# Patient Record
Sex: FEMALE
[De-identification: names, ages, dates, MRNs, and addresses within clinical notes are randomized per-mention and may not be internally consistent; named-entity substitution may affect disease eponyms.]

---

## 2022-09-22 ENCOUNTER — APPOINTMENT (OUTPATIENT)
Dept: ORTHOPEDIC SURGERY | Facility: CLINIC | Age: 52
End: 2022-09-22

## 2022-10-20 ENCOUNTER — APPOINTMENT (OUTPATIENT)
Dept: ORTHOPEDIC SURGERY | Facility: CLINIC | Age: 52
End: 2022-10-20

## 2022-10-20 PROCEDURE — 99204 OFFICE O/P NEW MOD 45 MIN: CPT

## 2022-10-20 RX ORDER — CYCLOBENZAPRINE HYDROCHLORIDE 5 MG/1
5 TABLET, FILM COATED ORAL
Qty: 45 | Refills: 1 | Status: ACTIVE | COMMUNITY
Start: 2022-10-20 | End: 1900-01-01

## 2022-10-20 NOTE — ASSESSMENT
[FreeTextEntry1] : Intermittent RUE radic, no moreso spasm\par \par NSAIDs- Patient warned of risk of medication to GI tract, increased blood pressure, cardiac risk, and risk of fluid retention.  Advised to clear medication with internist or PCP if any concurrent health problem with heart, blood pressure, or GI system exists.\par \par Muscle Relaxants- To help decrease muscle spasm and assist with pain relief. Advised of sedating effects and instructed not to drive, operate heavy machinery, or take with other sedating medications.

## 2022-10-20 NOTE — IMAGING
[de-identified] : CSPINE\par Inspection: No rash or ecchymosis\par Palpation: No spasm in traps, rhomboids, paracervicals\par ROM: Full with no pain\par Strength: 5/5 bilateral deltoid, biceps, triceps, wrist flexors, wrist extensors, , abductors\par Sensation: Sensation present to light touch bilateral C5-T1 distributions\par Reflexes: Positive Calderon's bilaterally \par \par Bilateral Shoulders-\par Palpation: No tenderness to palpation\par ROM: Full with no pain\par Strength: Decent strength with rotator cuff testing\par Provocative maneuvers: Negative impingement testing  [Straightening consistent with spasm] : Straightening consistent with spasm [Disc space narrowing] : Disc space narrowing

## 2022-10-20 NOTE — HISTORY OF PRESENT ILLNESS
[Neck] : neck [Dull/Aching] : dull/aching [Intermittent] : intermittent [Sleep] : sleep [Lying in bed] : lying in bed [de-identified] : Pt seen by non-spine partners in the past \par 9/13/21 Standup MRI C MRI  - report noted in chart. \par C2-C3: Bulge with central herniation and no stenosis. Facet hypertrophy left\par greater than right with no significant foraminal stenosis.\par \par C3-C4: Bulge with no herniation or central stenosis. No foraminal stenosis.\par \par C4-C5: Bulge with no herniation or central stenosis. No foraminal stenosis.\par \par C5-C6: Bulge with no herniation or central stenosis. Luschka hypertrophy with\par no foraminal stenosis.\par \par C6-C7: Bulge with asymmetric to right herniation impressing on the thecal sac\par with contact of the cord but no central stenosis. Luschka hypertrophy with no\par foraminal stenosis.\par \par C7-T1: No herniation, foraminal stenosis, or central stenosis.\par Ind. review- \par C5/6 bulge with mild NF narrowing;\par C6/7 R paracentral HNP with NF narrowing\par --------------------\par NOTE FROM [facility]:  Ms. Dipti Abreu, a 51-year-old female, presents today for a few weeks of neck pain with NT and pain radiating down the rigth arm when waking up. also pain in the scapular area. notes similar pain 4 moths ago with known hx of OA.\par 9/24/21- notes improved with steroid then pain returned and started steroid for something else, feeling better. \par mri cs - standup - indepepdnetly interpreted - c6-7 r side/central hnp\par 10/22/21- notes limited improvement with PT. notes she still has trouble sleeping and pain around shoulder blade. notes\par left side arm radicular pain is improved. \par \par 10/20/2022: DIPTI is a 52 year old female here today for neck pain. pt states pain is the same since seeing [facility] 10/2021. Continued pain b/l, though less pain in the R scapula and to the RUE. No bb dysfunction. Intermittent N/T to the RUE>LUE\par  [] : no [FreeTextEntry5] : \par  [FreeTextEntry9] : stretching  [de-identified] : 10/2021 [de-identified] : Cesarib

## 2022-12-01 ENCOUNTER — APPOINTMENT (OUTPATIENT)
Dept: ORTHOPEDIC SURGERY | Facility: CLINIC | Age: 52
End: 2022-12-01

## 2023-05-09 ENCOUNTER — APPOINTMENT (OUTPATIENT)
Dept: ORTHOPEDIC SURGERY | Facility: CLINIC | Age: 53
End: 2023-05-09
Payer: COMMERCIAL

## 2023-05-09 VITALS — HEIGHT: 65 IN | BODY MASS INDEX: 19.66 KG/M2 | WEIGHT: 118 LBS

## 2023-05-09 DIAGNOSIS — Z78.9 OTHER SPECIFIED HEALTH STATUS: ICD-10-CM

## 2023-05-09 DIAGNOSIS — M62.838 OTHER MUSCLE SPASM: ICD-10-CM

## 2023-05-09 DIAGNOSIS — Z00.00 ENCOUNTER FOR GENERAL ADULT MEDICAL EXAMINATION W/OUT ABNORMAL FINDINGS: ICD-10-CM

## 2023-05-09 DIAGNOSIS — G25.89 OTHER SPECIFIED EXTRAPYRAMIDAL AND MOVEMENT DISORDERS: ICD-10-CM

## 2023-05-09 PROCEDURE — 73010 X-RAY EXAM OF SHOULDER BLADE: CPT | Mod: RT

## 2023-05-09 PROCEDURE — 99214 OFFICE O/P EST MOD 30 MIN: CPT

## 2023-05-09 PROCEDURE — 73030 X-RAY EXAM OF SHOULDER: CPT | Mod: RT

## 2023-05-09 RX ORDER — METHYLPREDNISOLONE 4 MG/1
4 TABLET ORAL
Qty: 1 | Refills: 0 | Status: ACTIVE | COMMUNITY
Start: 2023-05-09 | End: 1900-01-01

## 2023-05-09 NOTE — DISCUSSION/SUMMARY
[de-identified] : 53f with right UE cervical radiculopathy and scapular dyskinesia\par 1) MDP rx, discussed no nsaids while taking\par 2) can c/w with cyclobenzaprine prn\par 3) start physical therapy\par 4) recommend glucosamine / chondroitin\par 5) heat, rest and activity modification\par 6) rtc 4-6 weeks\par \par Entered by Oma Izaguirre acting as scribe.\par Dr. Yadav-\par The documentation recorded by the scribe accurately reflects the service I personally performed and the decisions made by me.

## 2023-05-09 NOTE — HISTORY OF PRESENT ILLNESS
[9] : 9 [0] : 0 [Tightness] : tightness [Leisure] : leisure [Work] : work [Sleep] : sleep [Heat] : heat [Extending back] : extending back [de-identified] : 05/09/2023 Ms. DIPTI CHAU, a 53 year old female, presents today for right shoulder. Hx of cervical radiculopathy and HNP on MRI from 9/2021. Reports that recently while running she started to experience pain over the right shoulder, upper arm and scapular region. Also notes some numbness over the upper arm. was seen in a UC on 05.07.23 and was given cyclobenzaprine and naproxen. \par  [] : no [FreeTextEntry1] : Rt Shoulder Blade/ Rt Arm  [FreeTextEntry3] : 05/07/23 [FreeTextEntry5] : Pt complaining of ongoing Rt Shoulder blade pain and Rt Arm pain since Sunday. ROM limied. Pt feels numbness going down the arm no tingling.   [FreeTextEntry9] : heating pad  [de-identified] : Urgent Care  [de-identified] : EKG, CT Lungs

## 2023-05-09 NOTE — PHYSICAL EXAM
[Right] : right shoulder [NL (0-180)] : full active abduction 0-180 degrees [NL (0-70)] : full internal rotation 0-70 degrees [NL (0-90)] : full external rotation 0-90 degrees [] : motor and sensory intact distally [de-identified] : -kizzy

## 2023-05-15 ENCOUNTER — APPOINTMENT (OUTPATIENT)
Dept: ORTHOPEDIC SURGERY | Facility: CLINIC | Age: 53
End: 2023-05-15
Payer: COMMERCIAL

## 2023-05-15 PROCEDURE — 72040 X-RAY EXAM NECK SPINE 2-3 VW: CPT

## 2023-05-15 PROCEDURE — 99213 OFFICE O/P EST LOW 20 MIN: CPT

## 2023-05-16 NOTE — HISTORY OF PRESENT ILLNESS
[7] : 7 [Dull/Aching] : dull/aching [Throbbing] : throbbing [Constant] : constant [Bending forward] : bending forward [Extending back] : extending back [] : yes [de-identified] : 53-year-old right-hand-dominant female presents the office today for evaluation of radiating right shoulder and neck pain.  Patient reports roughly 2 weeks of pain radiating from her neck into her right hand.  She has a history of similar symptoms roughly 2 years ago which were treated with a course of oral steroids.  She was recently seen at urgent care where she was given lidocaine patches, anti-inflammatories and another Medrol Dosepak.  Unfortunately the patient reports persistent shoulder pain and paresthesias after completing the Medrol Dosepak.  She is quite bothered by this and requests some intervention today.\par \par Of note, the patient had an MRI of the cervical spine in 2021 with C5/C6 disc bulge and mild NF narrowing, C6/7 right paracentral HNP and NF narrowing. [de-identified] : 05/09/2023 [de-identified] :

## 2023-05-16 NOTE — IMAGING
[de-identified] : Full neck range of motion\par - Spurling's\par - Calderon's\par \par Shoulder (R / L)\par Normal muscle tone/ bulk\par NTTP throughout\par  / 170\par Abd 80 / 80\par ER at side 60 / 60\par IR T10 / T10\par SILT throughout\par - Elmira's\par - Belly Press\par - Hawkin's \par - Mayorga's \par 4+/5 elbow flexion\par 4+/5 elbow extension\par 5/5 shoulder abduction\par 5/5 wrist extension\par 5/5 finger abduction

## 2023-05-16 NOTE — HISTORY OF PRESENT ILLNESS
[7] : 7 [Dull/Aching] : dull/aching [Throbbing] : throbbing [Constant] : constant [Bending forward] : bending forward [Extending back] : extending back [] : yes [de-identified] : 53-year-old right-hand-dominant female presents the office today for evaluation of radiating right shoulder and neck pain.  Patient reports roughly 2 weeks of pain radiating from her neck into her right hand.  She has a history of similar symptoms roughly 2 years ago which were treated with a course of oral steroids.  She was recently seen at urgent care where she was given lidocaine patches, anti-inflammatories and another Medrol Dosepak.  Unfortunately the patient reports persistent shoulder pain and paresthesias after completing the Medrol Dosepak.  She is quite bothered by this and requests some intervention today.\par \par Of note, the patient had an MRI of the cervical spine in 2021 with C5/C6 disc bulge and mild NF narrowing, C6/7 right paracentral HNP and NF narrowing. [de-identified] : 05/09/2023 [de-identified] :

## 2023-05-16 NOTE — IMAGING
[de-identified] : Full neck range of motion\par - Spurling's\par - Calderon's\par \par Shoulder (R / L)\par Normal muscle tone/ bulk\par NTTP throughout\par  / 170\par Abd 80 / 80\par ER at side 60 / 60\par IR T10 / T10\par SILT throughout\par - Elmira's\par - Belly Press\par - Hawkin's \par - Mayorga's \par 4+/5 elbow flexion\par 4+/5 elbow extension\par 5/5 shoulder abduction\par 5/5 wrist extension\par 5/5 finger abduction

## 2023-05-16 NOTE — DISCUSSION/SUMMARY
[de-identified] : - discussed etiology/ pathophysiology of the patient's symptoms\par - reviewed treatment options\par - discussed the role for injections, physical therapy and anti-inflammatory medications\par - reviewed r/b/a to these\par - activity modifications\par - PT Rx\par - NSAIDs prn pain\par - f/u with Dr. Hubbard for possible CSI

## 2023-05-16 NOTE — DATA REVIEWED
[FreeTextEntry1] : 2 views of cervical spine: Loss of cervical lordosis, spontaneous posterior fusion at C2-C3, no acute fractures

## 2023-05-16 NOTE — DISCUSSION/SUMMARY
[de-identified] : - discussed etiology/ pathophysiology of the patient's symptoms\par - reviewed treatment options\par - discussed the role for injections, physical therapy and anti-inflammatory medications\par - reviewed r/b/a to these\par - activity modifications\par - PT Rx\par - NSAIDs prn pain\par - f/u with Dr. Hubbard for possible CSI

## 2023-05-17 ENCOUNTER — APPOINTMENT (OUTPATIENT)
Dept: PAIN MANAGEMENT | Facility: CLINIC | Age: 53
End: 2023-05-17
Payer: COMMERCIAL

## 2023-05-17 VITALS — HEIGHT: 65 IN | BODY MASS INDEX: 19.66 KG/M2 | WEIGHT: 118 LBS

## 2023-05-17 DIAGNOSIS — M79.18 MYALGIA, OTHER SITE: ICD-10-CM

## 2023-05-17 PROCEDURE — 20552 NJX 1/MLT TRIGGER POINT 1/2: CPT

## 2023-05-17 PROCEDURE — J3490M: CUSTOM

## 2023-05-17 PROCEDURE — 99204 OFFICE O/P NEW MOD 45 MIN: CPT | Mod: 25

## 2023-05-17 RX ORDER — GABAPENTIN 300 MG/1
300 CAPSULE ORAL 3 TIMES DAILY
Qty: 90 | Refills: 0 | Status: ACTIVE | COMMUNITY
Start: 2023-05-17 | End: 1900-01-01

## 2023-05-17 NOTE — DISCUSSION/SUMMARY
[de-identified] : \par After discussing various treatment options with the patient including but not limited to oral medications, physical therapy, exercise, modalities as well as interventional spinal injections, we have decided with the following plan: \par \par Cervical Radiculopathy: \par - start gabapentin 300mg TID, c/w flexeril 5-10mg tid prn\par - MRI C-spine 9/11/21 report reviewed (stand up MRI). We will obtain an updated MRI as her symptoms have changed since 2021. \par - consider C7/T1 NAZARIO after MRI.  \par - c/w HEP/PT \par \par Myofascial Pain: \par - c/w pharmacological agents as above\par - TPI to cervical paraspinal, R trapezius performed. Procedure explained using an anatomical model.  Risks and benefits explained to patient who verbalized understanding, including increased risk of infection, bleeding, nerve injury.\par - c/w HEP/PT\par \par - follow up 1-2 weeks for MRI review. \par \par Octavio Hubbard MD\par \par

## 2023-05-17 NOTE — PROCEDURE
[FreeTextEntry3] : \par Trigger Point Procedure Note\par Proceduretrigger point injections of R cervical paraspinal, R trapezius\par Diagnosis: myofascial pain\par \par After obtaining informed consent and performing a time out, the area overlying the appropriate muscles was prepped and draped in sterile fashion. Bupivacaine 0.25% 8 mL + kenalog 10mg was injected at 2 trigger point areas through a 25G needle after negative aspiration for heme. Bandaids applied to injection sites. No complications. \par \par Remainder to bupivacaine 30ml vial wasted.\par

## 2023-05-17 NOTE — PHYSICAL EXAM
[de-identified] : Gen: NAD\par Psych: mood appropriate for given condition\par Skin: intact\par Sensation: decreased to light touch over R arm, intact to lt touch bilaterally in c4-t1 \par Reflexes: 2+ b/l BR, Bicep, BR and patella Calderon negative\par ROM: Cervical ROM full, shoulder, elbow and wrist ROM full, \par Special tests: Cisneros's negative bilaterally, Hawkin's negative bilaterally, neg Empty can, neg Neer's. Spurling's pos on the R\par Palpation: +taught bands of muscle causing referred pain upon palpation of cervical paraspinal and right trapezius, tender levator scapula and left trapezius non tender bicipital tendon\par Motor:\par 				Right	Left	\par C4	Shoulder Abduction	 5	 5	\par C5	Elbow Flexion  		 5	 5	\par C6	Wrist Extension		 5	 5	\par C7	Elbow Extension 	 4	 4	\par C8/T1	Hand Intrinsics 		 5	 5	\par C8	First Dorsal Interosseus	 5	 5	\par C8	Abductor Pollicus Brevis	 5	 5	\par C5/6	Shoulder ER		 5	 5	\par \par

## 2023-06-06 ENCOUNTER — APPOINTMENT (OUTPATIENT)
Dept: PAIN MANAGEMENT | Facility: CLINIC | Age: 53
End: 2023-06-06
Payer: COMMERCIAL

## 2023-06-06 VITALS — WEIGHT: 118 LBS | HEIGHT: 65 IN | BODY MASS INDEX: 19.66 KG/M2

## 2023-06-06 PROCEDURE — 99214 OFFICE O/P EST MOD 30 MIN: CPT

## 2023-06-06 NOTE — HISTORY OF PRESENT ILLNESS
[Neck] : neck [Right Arm] : right arm [9] : 9 [7] : 7 [Radiating] : radiating [Sharp] : sharp [Shooting] : shooting [Stabbing] : stabbing [Tingling] : tingling [Constant] : constant [Household chores] : household chores [Leisure] : leisure [Sleep] : sleep [Social interactions] : social interactions [Heat] : heat [Sitting] : sitting [Standing] : standing [Walking] : walking [Bending forward] : bending forward [Exercising] : exercising [Stairs] : stairs [Lying in bed] : lying in bed [FreeTextEntry1] : Interval HPI 06/06/2023\par Pt returns after updated MRI C-spine for review. Her pain is similar in quality, characteristics, severity as iniital visit.  Patient denies loss of bowel/bladder function, new motor deficits, fevers, or chills. There is also associated numbness/tingling.\par \par TPI from 5/17/23 is giving her 50% ongoing relief of the myofascial pain. She continues PT and home stretches.  \par \par Initial HPI 5/17/23\par Ms. CHAU is a 53 year year old F who presents for initial evaluation for neck and arm pain. Pain started 2 years ago.  Has tried a home exercise program by a physician.  It is not associated with any inciting injury.  Pain radiates to down the R/L arm. Pain is described as shooting and electric shock when radiating.  Pain is worsened with sitting, coughing and bearing down.  Patient has failed conservative treatment with acetaminophen, NSAIDs, muscle relaxants, and physical therapy/HEP. Pain is causing significant functional limitation resulting in diminished quality of life and impaired age appropriate ADL's. Patient denies loss of bowel/bladder function, new motor deficits, fevers, or chills. There is also associated numbness/tingling.\par \par She also reports muscle spasm like pain, rated 7/10, aching, throbbing, worse with movement, alleivated with rest. She has tried tylenol, nsaids, flexeril, hot packs, lidocaine patch without relief. \par \par Images Reviewed: \par MRI C-spine 9/11/21 (stand up MRI, scanned into chart)\par  [] : no [de-identified] : mri

## 2023-06-06 NOTE — PHYSICAL EXAM
[de-identified] : Gen: NAD\par Psych: mood appropriate for given condition\par Skin: intact\par Sensation: decreased to light touch over R arm, intact to lt touch bilaterally in c4-t1 \par Reflexes: 2+ b/l BR, Bicep, BR and patella Calderon negative\par ROM: Cervical ROM full, shoulder, elbow and wrist ROM full, \par Special tests: Cisneros's negative bilaterally, Hawkin's negative bilaterally, neg Empty can, neg Neer's. Spurling's pos on the R\par Palpation: +taught bands of muscle causing referred pain upon palpation of cervical paraspinal and right trapezius, tender levator scapula and left trapezius non tender bicipital tendon\par Motor:\par 				Right	Left	\par C4	Shoulder Abduction	 5	 5	\par C5	Elbow Flexion  		 5	 5	\par C6	Wrist Extension		 5	 5	\par C7	Elbow Extension 	 4	 4	\par C8/T1	Hand Intrinsics 		 5	 5	\par C8	First Dorsal Interosseus	 5	 5	\par C8	Abductor Pollicus Brevis	 5	 5	\par C5/6	Shoulder ER		 5	 5	\par \par

## 2023-06-06 NOTE — DISCUSSION/SUMMARY
[de-identified] : \par After discussing various treatment options with the patient including but not limited to oral medications, physical therapy, exercise, modalities as well as interventional spinal injections, we have decided with the following plan: \par \par Cervical Radiculopathy: \par - c/w gabapentin 300mg TID, c/w flexeril 5-10mg tid prn\par - MRI C-spine 5/31/23 reviewed \par - schedule for C7/T1 NAZARIO. Procedure explained using an anatomical model.  Risks and benefits explained to patient who verbalized understanding, including increased risk of infection, bleeding, nerve injury.  \par - new referral for PT given  \par \par Myofascial Pain: \par - c/w pharmacological agents as above\par - can repeat TPI to cervical paraspinal, R trapezius performed if pain worsens \par - c/w HEP/PT\par \par - follow up 2-3 weeks post-procedure. \par \par Octavio Hubbard MD\par \par ILESI/CAUDAL\par \par Indications for an interlaminar epidural for this specific patient include the following \par \par - Pt has been in pain for at least 6 weeks and has failed conservative care (e.g. Exercise, physical methods, NSAID/ and or muscle relaxants) and has been compliant with these conservative measures \par - Pt has no major risk factors for spinal cancer or contraindicated condition \par - radicular pain is interfering with functional activity\par - pain is associated with symptoms of nerve root irritation \par - any numbness documented is accompanied with paresthesia \par - no evidence of systemic or local infection, bleeding tendency or unstable medical condition \par - Interlaminar epidural injections are provided as part of a comprehensive pain management program, which includes physical therapy, patient education, psychosocial support, and oral medications, where appropriate.\par - Pt has significant functional limitation resulting in diminished quality of life and impaired age appropriate ADL's. \par - diagnostic evaluation has ruled out other causes of pain\par - pt participating in an active rehabilitation program or home exercise program which has been discussed with the patient including heat ice and rest\par - no more than 3 epidurals will be done in a 6 month period at the same level with at least 14 days between injections\par - All repeat injections have at least 50% pain relief and increase functional gain and physical activity, and reduction in reliance on the use of medication and or physical therapy\par - MAC is only offered in cases of severe needle phobia \par -  Injection done at C7/T1 level(s) which is consistent with patient's dermatomal pain complaint\par

## 2023-06-12 ENCOUNTER — APPOINTMENT (OUTPATIENT)
Dept: PAIN MANAGEMENT | Facility: CLINIC | Age: 53
End: 2023-06-12
Payer: COMMERCIAL

## 2023-06-12 PROCEDURE — 62321 NJX INTERLAMINAR CRV/THRC: CPT

## 2023-06-12 NOTE — PROCEDURE
[FreeTextEntry3] : Date of Service: 06/12/2023 \par \par Account: [id number] \par \par Patient: DIPTI CHAU \par \par YOB: 1970 \par \par Age: 53 year \par \par Surgeon:                                                      Octavio Hubbard M.D.\par \par Pre-Operative Diagnosis:                         Cervical Radiculopathy (M54.12) \par \par Post Operative Diagnosis:                       Cervical Radiculopathy (M54.12)\par \par Procedure:                                                  Interlaminar cervical epidural steroid injection (C7-T1) under fluoroscopic guidance\par \par Anesthesia:                                                 MAC\par \par \par This procedure was carried out using fluoroscopic guidance.  The risks and benefits of the procedure were discussed extensively with the patient.  The consent of the patient was obtained and the following procedure was performed.\par \par The patient was placed in the prone position using a thoracic and chin support.  The cervical area was prepped and draped in a sterile fashion.  The fluoroscope visualized the C7-T1 interspace using slight cephalad-caudad angulation and this area was marked.  Using sterile technique the superficial skin was anesthetized with 1% Lidocaine without epinephrine.  A 18 gauge Tuohy needle was advanced under fluoroscopy using [id number]-dopta technique until ligament was engaged.  The stilette was then removed and a loss of resistance syringe with sterile saline was attached. The needle was then advanced under fluoroscopic guidance until there was loss of resistance.  Lateral view confirmed final needle tip placement in the epidural space.  After negative aspiration for heme and CSF, 1 cc of Omnipaque confirmed good cervical epiduragram.  \par \par Cervical epidurogram showed no evidence of intrathecal or intravascular flow, and good bilateral epidural flow from C3 to T2 levels.  An injectate of 6 cc of preservative free normal saline plus 12 mg of betamethasone was then injected into the epidural space. The needle was subsequently removed and pressure was applied.\par \par Anesthesia personnel were present throughout the procedure.  The patient tolerated the procedure well and was instructed to contact me immediately if there were any problems.\par \par Octavio Hubbard M.D.\par

## 2023-06-22 ENCOUNTER — APPOINTMENT (OUTPATIENT)
Dept: ORTHOPEDIC SURGERY | Facility: CLINIC | Age: 53
End: 2023-06-22

## 2023-07-03 NOTE — HISTORY OF PRESENT ILLNESS
[FreeTextEntry1] : 07/05/2023\par PP FU C7-T1 NAZARIO ON 06/12/23 \par \par Interval HPI 06/06/2023\par Pt returns after updated MRI C-spine for review. Her pain is similar in quality, characteristics, severity as iniital visit.  Patient denies loss of bowel/bladder function, new motor deficits, fevers, or chills. There is also associated numbness/tingling.\par \par TPI from 5/17/23 is giving her 50% ongoing relief of the myofascial pain. She continues PT and home stretches.  \par \par Initial HPI 5/17/23\par Ms. CHAU is a 53 year year old F who presents for initial evaluation for neck and arm pain. Pain started 2 years ago.  Has tried a home exercise program by a physician.  It is not associated with any inciting injury.  Pain radiates to down the R/L arm. Pain is described as shooting and electric shock when radiating.  Pain is worsened with sitting, coughing and bearing down.  Patient has failed conservative treatment with acetaminophen, NSAIDs, muscle relaxants, and physical therapy/HEP. Pain is causing significant functional limitation resulting in diminished quality of life and impaired age appropriate ADL's. Patient denies loss of bowel/bladder function, new motor deficits, fevers, or chills. There is also associated numbness/tingling.\par \par She also reports muscle spasm like pain, rated 7/10, aching, throbbing, worse with movement, alleivated with rest. She has tried tylenol, nsaids, flexeril, hot packs, lidocaine patch without relief. \par \par Images Reviewed: \par MRI C-spine 9/11/21 (stand up MRI, scanned into chart)\par  [Neck] : neck [Right Arm] : right arm [9] : 9 [7] : 7 [Radiating] : radiating [Sharp] : sharp [Shooting] : shooting [Stabbing] : stabbing [Tingling] : tingling [Constant] : constant [Household chores] : household chores [Leisure] : leisure [Sleep] : sleep [Social interactions] : social interactions [Heat] : heat [Sitting] : sitting [Standing] : standing [Walking] : walking [Bending forward] : bending forward [Exercising] : exercising [Stairs] : stairs [Lying in bed] : lying in bed [] : yes [de-identified] : mri

## 2023-07-03 NOTE — PHYSICAL EXAM
[de-identified] : Gen: NAD\par Psych: mood appropriate for given condition\par Skin: intact\par Sensation: decreased to light touch over R arm, intact to lt touch bilaterally in c4-t1 \par Reflexes: 2+ b/l BR, Bicep, BR and patella Calderon negative\par ROM: Cervical ROM full, shoulder, elbow and wrist ROM full, \par Special tests: Cisneros's negative bilaterally, Hawkin's negative bilaterally, neg Empty can, neg Neer's. Spurling's pos on the R\par Palpation: +taught bands of muscle causing referred pain upon palpation of cervical paraspinal and right trapezius, tender levator scapula and left trapezius non tender bicipital tendon\par Motor:\par 				Right	Left	\par C4	Shoulder Abduction	 5	 5	\par C5	Elbow Flexion  		 5	 5	\par C6	Wrist Extension		 5	 5	\par C7	Elbow Extension 	 4	 4	\par C8/T1	Hand Intrinsics 		 5	 5	\par C8	First Dorsal Interosseus	 5	 5	\par C8	Abductor Pollicus Brevis	 5	 5	\par C5/6	Shoulder ER		 5	 5	\par \par

## 2023-07-03 NOTE — DISCUSSION/SUMMARY
[de-identified] : \par After discussing various treatment options with the patient including but not limited to oral medications, physical therapy, exercise, modalities as well as interventional spinal injections, we have decided with the following plan: \par \par Cervical Radiculopathy: \par - c/w gabapentin 300mg TID, c/w flexeril 5-10mg tid prn\par - MRI C-spine 5/31/23 reviewed \par - schedule for C7/T1 NAZARIO. Procedure explained using an anatomical model.  Risks and benefits explained to patient who verbalized understanding, including increased risk of infection, bleeding, nerve injury.  \par - new referral for PT given  \par \par Myofascial Pain: \par - c/w pharmacological agents as above\par - can repeat TPI to cervical paraspinal, R trapezius performed if pain worsens \par - c/w HEP/PT\par \par - follow up 2-3 weeks post-procedure. \par \par Octavio Hubbard MD\par \par ILESI/CAUDAL\par \par Indications for an interlaminar epidural for this specific patient include the following \par \par - Pt has been in pain for at least 6 weeks and has failed conservative care (e.g. Exercise, physical methods, NSAID/ and or muscle relaxants) and has been compliant with these conservative measures \par - Pt has no major risk factors for spinal cancer or contraindicated condition \par - radicular pain is interfering with functional activity\par - pain is associated with symptoms of nerve root irritation \par - any numbness documented is accompanied with paresthesia \par - no evidence of systemic or local infection, bleeding tendency or unstable medical condition \par - Interlaminar epidural injections are provided as part of a comprehensive pain management program, which includes physical therapy, patient education, psychosocial support, and oral medications, where appropriate.\par - Pt has significant functional limitation resulting in diminished quality of life and impaired age appropriate ADL's. \par - diagnostic evaluation has ruled out other causes of pain\par - pt participating in an active rehabilitation program or home exercise program which has been discussed with the patient including heat ice and rest\par - no more than 3 epidurals will be done in a 6 month period at the same level with at least 14 days between injections\par - All repeat injections have at least 50% pain relief and increase functional gain and physical activity, and reduction in reliance on the use of medication and or physical therapy\par - MAC is only offered in cases of severe needle phobia \par -  Injection done at C7/T1 level(s) which is consistent with patient's dermatomal pain complaint\par

## 2023-07-05 ENCOUNTER — APPOINTMENT (OUTPATIENT)
Dept: PAIN MANAGEMENT | Facility: CLINIC | Age: 53
End: 2023-07-05
Payer: COMMERCIAL

## 2023-07-07 ENCOUNTER — APPOINTMENT (OUTPATIENT)
Dept: PAIN MANAGEMENT | Facility: CLINIC | Age: 53
End: 2023-07-07
Payer: COMMERCIAL

## 2023-07-07 VITALS — WEIGHT: 118 LBS | BODY MASS INDEX: 19.66 KG/M2 | HEIGHT: 65 IN

## 2023-07-07 DIAGNOSIS — M54.12 RADICULOPATHY, CERVICAL REGION: ICD-10-CM

## 2023-07-07 PROCEDURE — 99213 OFFICE O/P EST LOW 20 MIN: CPT

## 2023-07-07 NOTE — HISTORY OF PRESENT ILLNESS
[Neck] : neck [Right Arm] : right arm [9] : 9 [7] : 7 [Radiating] : radiating [Sharp] : sharp [Shooting] : shooting [Stabbing] : stabbing [Tingling] : tingling [Constant] : constant [Household chores] : household chores [Leisure] : leisure [Sleep] : sleep [Social interactions] : social interactions [Heat] : heat [Sitting] : sitting [Standing] : standing [Walking] : walking [Bending forward] : bending forward [Exercising] : exercising [Stairs] : stairs [Lying in bed] : lying in bed [FreeTextEntry1] : Interval HPI 07/07/2023\par Pt returns after C7-T1 NAZARIO ON 06/12/23. She reports >90% ongoing relief, with improvement of ADLs and quality of life. \par \par Interval HPI 06/06/2023\par Pt returns after updated MRI C-spine for review. Her pain is similar in quality, characteristics, severity as iniital visit.  Patient denies loss of bowel/bladder function, new motor deficits, fevers, or chills. There is also associated numbness/tingling.\par \par TPI from 5/17/23 is giving her 50% ongoing relief of the myofascial pain. She continues PT and home stretches.  \par \par Initial HPI 5/17/23\par Ms. CHAU is a 53 year year old F who presents for initial evaluation for neck and arm pain. Pain started 2 years ago.  Has tried a home exercise program by a physician.  It is not associated with any inciting injury.  Pain radiates to down the R/L arm. Pain is described as shooting and electric shock when radiating.  Pain is worsened with sitting, coughing and bearing down.  Patient has failed conservative treatment with acetaminophen, NSAIDs, muscle relaxants, and physical therapy/HEP. Pain is causing significant functional limitation resulting in diminished quality of life and impaired age appropriate ADL's. Patient denies loss of bowel/bladder function, new motor deficits, fevers, or chills. There is also associated numbness/tingling.\par \par She also reports muscle spasm like pain, rated 7/10, aching, throbbing, worse with movement, alleivated with rest. She has tried tylenol, nsaids, flexeril, hot packs, lidocaine patch without relief. \par \par Images Reviewed: \par MRI C-spine 9/11/21 (stand up MRI, scanned into chart)\par  [] : no [de-identified] : mri

## 2023-07-07 NOTE — PHYSICAL EXAM
[de-identified] : Gen: NAD\par Psych: mood appropriate for given condition\par Skin: intact\par Sensation: decreased to light touch over R arm, intact to lt touch bilaterally in c4-t1 \par Reflexes: 2+ b/l BR, Bicep, BR and patella Calderon negative\par ROM: Cervical ROM full, shoulder, elbow and wrist ROM full, \par Special tests: Cisneros's negative bilaterally, Hawkin's negative bilaterally, neg Empty can, neg Neer's. Spurling's pos on the R\par Palpation: +taught bands of muscle causing referred pain upon palpation of cervical paraspinal and right trapezius, tender levator scapula and left trapezius non tender bicipital tendon\par Motor:\par 				Right	Left	\par C4	Shoulder Abduction	 5	 5	\par C5	Elbow Flexion  		 5	 5	\par C6	Wrist Extension		 5	 5	\par C7	Elbow Extension 	 4	 4	\par C8/T1	Hand Intrinsics 		 5	 5	\par C8	First Dorsal Interosseus	 5	 5	\par C8	Abductor Pollicus Brevis	 5	 5	\par C5/6	Shoulder ER		 5	 5	\par \par

## 2025-05-13 ENCOUNTER — APPOINTMENT (OUTPATIENT)
Dept: ORTHOPEDIC SURGERY | Facility: CLINIC | Age: 55
End: 2025-05-13
Payer: COMMERCIAL

## 2025-05-13 DIAGNOSIS — M54.12 RADICULOPATHY, CERVICAL REGION: ICD-10-CM

## 2025-05-13 DIAGNOSIS — L29.9 PRURITUS, UNSPECIFIED: ICD-10-CM

## 2025-05-13 PROCEDURE — 72050 X-RAY EXAM NECK SPINE 4/5VWS: CPT

## 2025-05-13 PROCEDURE — 99214 OFFICE O/P EST MOD 30 MIN: CPT

## 2025-05-13 RX ORDER — GABAPENTIN 100 MG/1
100 CAPSULE ORAL
Qty: 60 | Refills: 1 | Status: ACTIVE | COMMUNITY
Start: 2025-05-13 | End: 1900-01-01

## 2025-05-27 ENCOUNTER — NON-APPOINTMENT (OUTPATIENT)
Age: 55
End: 2025-05-27

## 2025-05-29 ENCOUNTER — APPOINTMENT (OUTPATIENT)
Dept: ORTHOPEDIC SURGERY | Facility: CLINIC | Age: 55
End: 2025-05-29
Payer: COMMERCIAL

## 2025-05-29 DIAGNOSIS — M54.12 RADICULOPATHY, CERVICAL REGION: ICD-10-CM

## 2025-05-29 DIAGNOSIS — L29.9 PRURITUS, UNSPECIFIED: ICD-10-CM

## 2025-05-29 PROCEDURE — 99215 OFFICE O/P EST HI 40 MIN: CPT
